# Patient Record
(demographics unavailable — no encounter records)

---

## 2024-11-04 NOTE — HISTORY OF PRESENT ILLNESS
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   Underwent OLT 1/9/2023 with solumedrol/simulect induction, he was d/c on POD # 9 s/p OLT with good graft function.   He was re-admitted 1/22-24/23 - after suffering a fall from standing. His trauma w/u was negative. LFTs remained stable and WNLs and he was d/c on 1/24 admitted again last week with toxic prograf levels and NISHA; was taking wrong dose of tacro  Explant Pathology: Cirrhosis with background steatohepatitis. One jose cruz hepatis lymph node, negative for carcinoma (0/1) - Gallbladder with chronic cholecystitis - Donor gallbladder, removal - Gallbladder with acute and chronic inflammation - One lymph node, negative for carcinoma (0/1)  treated at [facility] for left cheek swelling with cipro x 5d ?sialoadenitis/sialolithiasis Re-admitted [facility] 4/8/23-4/12/23 with abdo pain, n/v, diarrhea -CT - enteritis; -GI PCR:  norovirus symptoms self-resolved Admitted July 2023  - Patient had episode of moderate ACR - treated with IV steroid bolus and prednisone taper Admitted August 2023 - Gabe DELGADO for diarrhea and NISHA in setting for supratherapeutic tacro level and transient SBO Patient readmitted to [facility] for SBO with EVELINE In September with Supratherapeutic tacro level  7/2024  Interval hx: Current IS: Tacro 1 mg / 1 mg  with Myfortic 360mg BID Off prophylaxis - had periodic elevations in CMV in past Graft function has otherwise been stable Continue on Remeron - switched from Seroquel helps with anxiety.  He had PETH positive in March - higher than January. Now negative since June 30.  Started on naltrexone which helps with the craving.  Still stressful with issues with ex wife thinking about transitioning care back to [place] where his son is but overall situation is better He had 3-4 drinks during his vacation recently No cravings here since his return Did not do Colon / DEXA / DERM - plans on doing flu shot today

## 2024-11-04 NOTE — ASSESSMENT
[FreeTextEntry1] : Moisés Garsia is a 53 yo male with pmhx of AUD reportedly sober for 2-3 years, COVID PNA requiring intubation. ETOH Cirrhosis s/p liver transplant 1/9/23. Multiple admissions for hyperkalemia. Prior admission for norovirus. Concern for HAT - negative on CT during that admission. Had admissions for colitis / Admission for ACR / SBO s/p Ex Lap  GRAFT: hx of ACR due to likely missed medications - biopsy proven but resolved now IMMUNO: Continue tacro 1/1 Myfortic 360mg BID. off pred off pphx.  CMV negative on 7/1/2024 off Valcyte  Relapse ETOH use - pressured in social situations - d/w Psych - needs therapy - never enrolled in RPP -continue naltrexone 50mg PO  PPX: ASA  NISHA - creatinine baseline around 1.1. very responsive to fluid. - instructed to increase oral hydration.   Mental Health / sw: Ongoing counseling with SW due to separation from wife - visitation with supervision - continue Mirtazapine increase to 30mg nightly. encourage to establish care with mental health provider.  Hx of left salivary gland infection, now with right gland slight enlarged will follow up with ENT if worsening.   #Health maintenance EGD--2/2021 gastric varices, small hiatal hernia and healing duodenal ulcer. path no intestinal metaplasia or h-pylori. no follow up needed.  Colonoscopy--2/2021 7 sub-centimeter colonic polyps with tubular adenoma. Due for repeat colonoscopy will schedule with Dr. Maldonado in Dec 10 2024. Prep ordered.  Chest CT (smoking hx) --non smoker.  DEXA- -order today again Skin check--will refer to derm today again Vaccination-- Flu will need in fall 2024 (now)  / COVID 1 dose in 2022 may need 2nd shot  / PCV done in 2022 per [facility] records/ need tdap booster/ need shingrex will discuss at next visit.   RTC in 3 months

## 2024-11-04 NOTE — REVIEW OF SYSTEMS
[Recent Weight Loss (___ Lbs)] : recent [unfilled] ~Ulb weight loss [Diarrhea] : diarrhea [Anxiety] : anxiety [Negative] : Neurological

## 2024-11-04 NOTE — PHYSICAL EXAM
[Well Nourished] : well nourished [Healthy Appearing] : healthy appearing [No Acute Distress] : no acute distress [EOMI] : extra ocular movement intact [Normal Hearing] : normal hearing [Supple] : the neck was supple [No Respiratory Distress] : no respiratory distress [No Accessory Muscle Use] : no accessory muscle use [Normal S1, S2] : normal S1 and S2 [Normal Gait] : normal gait [Normal Strength/Tone] : normal strength/tone [No Focal Deficits] : no focal deficits [No Motor Deficits] : no motor deficits [Scleral Icterus] : no scleral icterus [Hepatojugular Reflux] : no hepatojugular reflux [Abdominal Bruit] : no abdominal bruit [Ascites Fluid Wave] : no ascites fluid wave [Splenomegaly] : no splenomegaly [Spider Angioma] : no spider angioma [Asterixis] : no asterixis